# Patient Record
Sex: FEMALE | Race: WHITE | URBAN - METROPOLITAN AREA
[De-identification: names, ages, dates, MRNs, and addresses within clinical notes are randomized per-mention and may not be internally consistent; named-entity substitution may affect disease eponyms.]

---

## 2018-06-20 ENCOUNTER — OFFICE VISIT (OUTPATIENT)
Dept: ONCOLOGY | Facility: CLINIC | Age: 28
End: 2018-06-20
Attending: GENETIC COUNSELOR, MS
Payer: COMMERCIAL

## 2018-06-20 DIAGNOSIS — Z84.81 FAMILY HISTORY OF BRCA2 GENE POSITIVE: Primary | ICD-10-CM

## 2018-06-20 DIAGNOSIS — Z80.0 FAMILY HISTORY OF COLON CANCER: ICD-10-CM

## 2018-06-20 DIAGNOSIS — Z80.41 FAMILY HISTORY OF MALIGNANT NEOPLASM OF OVARY: ICD-10-CM

## 2018-06-20 PROCEDURE — 96040 ZZH GENETIC COUNSELING, EACH 30 MINUTES: CPT | Mod: ZF | Performed by: GENETIC COUNSELOR, MS

## 2018-06-20 NOTE — MR AVS SNAPSHOT
After Visit Summary   6/20/2018    Leigh Ann Houser    MRN: [id number]           Patient Information     Date Of Birth          1990        Visit Information        Provider Department      6/20/2018 2:15 PM Samira Taylor GC;  2 [address] CONSULT [facility]        Today's Diagnoses     Family history of BRCA2 gene positive    -  1    Family history of malignant neoplasm of ovary          Care Instructions        Assessing Cancer Risk  Only about 5-10% of cancers are thought to be due to an inherited cancer susceptibility gene.    These families often have:    Several people with the same or related types of cancer    Cancers diagnosed at a young age (before age 50)    Individuals with more than one primary cancer    Multiple generations of the family affected with cancer    BRCA1 and BRCA2 Genes  We each inherit two copies of every gene in our bodies: one from our mother, and one from our father.  Each gene has a specific job to do.  When a gene has a mistake or  mutation  in it, it does not work like it should.  Everyone has two copies of BRCA1 and two copies of BRCA2.  A single mutation in one of the copies of BRCA1 or BRCA2 increases the risk for breast and ovarian cancer, among others.  The risk for pancreatic cancer and melanoma may also be slightly increased in some families.  The tables below list the chance that someone with a BRCA mutation would develop cancer in his or her lifetime1,2,3,4.      Women   Men    General Population BRCA +   General Population BRCA +   Breast 12% 40-85%  Breast <1% ~7%   Ovarian 1-2% 10-40%  Prostate 16% 20%     Inheriting a mutation does not mean a person will develop cancer, but it does significantly increase his or her risk above the general population risk.    A person s ethnic background is also important to consider, as individuals of Ashkenazi Yarsanism ancestry have a higher chance of having a BRCA gene mutation.  There are three BRCA  mutations that occur more frequently in this population.     Genetic Testing  Genetic testing involves a simple blood test and will look at the genetic information in the BRCA1 and BRCA2 genes for any harmful mutations that are associated with increased cancer risk.  If possible, it is recommended that the person(s) who has had cancer be tested before other family members.  That person will give us the most useful information about whether or not a specific gene is associated with the cancer in the family.     Results  There are three possible results of BRCA1 and BRCA2 genetic testing:    Positive--a harmful mutation was identified    Negative--no mutation was identified    Variant of unknown significance--a variation in one of the genes was identified, but it is unclear how this impacts cancer risk in the family  Advantages and Disadvantages  There are advantages and disadvantages to genetic testing of these genes.    Advantages    May clarify your cancer risk    Can help you make medical decisions    May explain the cancers in your family    May give useful information to your family members (if you share your results)    Disadvantages    Possible negative emotional impact of learning about inherited cancer risk    Uncertainty in interpreting a negative test result in some situations    Possible genetic discrimination concerns (see below)    Inheritance   BRCA1 and BRCA2 mutations are inherited in an autosomal dominant pattern.  This means that if a parent has a mutation, each of his or her children will have a 50% chance of inheriting that same mutation.  Therefore, each child--male or female--would have a 50% chance of being at increased risk for developing cancer.                                              Image obtained from Genetics Home Reference, 2013     Genetic Information Nondiscrimination Act (NOEMÍ)  NOEMÍ is a federal law that protects individuals from health insurance or employment discrimination  based on a genetic test result alone.  Although rare, there are currently no legal protections in terms of life insurance, long term care, or disability insurances.  Visit the National Human Genome Research [place] at Genome.gov/88624772 to learn more.    Reducing Cancer Risk  Current screening recommendations for women with a BRCA mutation include1:    Breast:  o Breast awareness starting at age 18  o Clinical breast exams starting at age 25; repeated every 6-12 months  o Annual breast MRI starting at age 25 (or possibly younger)  o Annual mammogram and breast MRI at age 30 (for women with and without a breast cancer history)  o Consideration of preventative mastectomy    Ovarian:   o Consideration of transvaginal ultrasound and CA-125 blood test starting at age 30 (or possibly younger); repeated every 6 months  o Recommendation for surgery to remove the ovaries and fallopian tubes after child bearing or by age 35-40     Current screening recommendations for men with a BRCA mutation include1:    Breast:  o Self-breast exams starting at age 35  o Annual clinical breast exams starting at age 35    Prostate:   o Recommend prostate cancer screening starting at age 45 for BRCA2 carriers  o Consider prostate cancer screening starting at age 45 for BRCA1 carriers    Both men and women with BRCA mutations should talk to their doctor or genetic counselor about screening for pancreatic cancer and melanoma.  In addition, the age at which to start screening may be modified based on family history of young cancer.    Questions to Think About Regarding Genetic Testing    What effect will the test result have on me and my relationship with my family members if I have an inherited gene mutation?  If I don t have a gene mutation?    Should I share my test results, and how will my family react to this news, which may also affect them?    Are my children ready to learn new information that may one day affect their own  health?    Resources  FORCE: Facing Our Risk of Cancer Empowered [email or web address]   Bright Pink bebrightpink.org   American Cancer Society (ACS) cancer.org   National Cancer [place] (NCI) cancer.gov     Please call us if you have any questions or concerns.     Cancer Risk Management Program [phone]-CANCER ([phone]  ? Michelle Conrad, MS [facility]          [phone]  ? Jayne Marcelo, MS, [facility]  [phone]  ? Samira Taylor, MS, [facility]  [phone]  ? Jeovany Santoro, MS, [facility]  [phone]    References:  1. National Comprehensive Cancer Network. Clinical practice guidelines in oncology, colorectal cancer screening. Available online (registration required). 2013.            Follow-ups after your visit        Your next 10 appointments already scheduled     Jun 20, 2018  3:30 PM CDT   [facility] with  [facility]   [facility] ([facility])    [address] [place] [address]  [address] [place] [id number] [phone]              Who to contact     If you have questions or need follow up information about today's clinic visit or your schedule please contact [facility] directly at [phone].  Normal or non-critical lab and imaging results will be communicated to you by MyChart, letter or phone within 4 business days after the clinic has received the results. If you do not hear from us within 7 days, please contact the clinic through MyChart or phone. If you have a critical or abnormal lab result, we will notify you by phone as soon as possible.  Submit refill requests through [employer] or call your pharmacy and they will forward the refill request to us. Please allow 3 business days for your refill to be completed.          Additional Information About Your Visit        Nu3 Information     Nu3 gives you secure access to your electronic health record. If you see a primary care provider, you can also send messages to your care team and make  appointments. If you have questions, please call your primary care clinic.  If you do not have a primary care provider, please call [phone] and they will assist you.        Care EveryWhere ID     This is your Care EveryWhere ID. This could be used by other organizations to access your [place] medical records  [id number]         Blood Pressure from Last 3 Encounters:   No data found for BP    Weight from Last 3 Encounters:   No data found for Wt              Today, you had the following     No orders found for display       Primary Care Provider Fax #    Physician No Ref-Primary [phone]       No address on file        Equal Access to Services     [facility]: Hadii neisha Magadleno, waho trinidad, lynn bartlett, ziggy cedeno . So [facility] [phone].    ATENCIÓN: Si habla español, tiene a eric disposición servicios gratuitos de asistencia lingüística. [facility] [phone].    We comply with applicable federal civil rights laws and [place] laws. We do not discriminate on the basis of race, color, national origin, age, disability, sex, sexual orientation, or gender identity.            Thank you!     Thank you for choosing [facility]  for your care. Our goal is always to provide you with excellent care. Hearing back from our patients is one way we can continue to improve our services. Please take a few minutes to complete the written survey that you may receive in the mail after your visit with us. Thank you!             Your Updated Medication List - Protect others around you: Learn how to safely use, store and throw away your medicines at www.disposemymeds.org.      Notice  As of 6/20/2018  3:07 PM    You have not been prescribed any medications.

## 2018-06-20 NOTE — LETTER
Cancer Risk Management  Program Locations    [facility]  [facility]  Mailing Address  Cancer Risk Management Program  [facility]  [address]  [address] [address]  [place] [address]    New patient appointments  [phone]  June 21, 2018    Leigh Ann Houser  [address]  [place] [address]      Dear Leigh Ann,    It was a pleasure meeting with you at the [facility] on 6/20/2018.  Here is a copy of the progress note from your recent genetic counseling visit to the Cancer Risk Management Program.  If you have any additional questions, please feel free to call.    Referring Provider: Billie Mitchell    Presenting Information:   I met with Leigh Ann Houser today for genetic counseling at the Cancer Risk Management Program at the [facility] to discuss her family history of ovarian cancer with an identified BRCA2 gene mutation.  She is here today to review this history, cancer screening recommendations, and available genetic testing options.    Personal History:  Leigh Ann is a 27 year old female.  She does not have any personal history of cancer.  She had her first menstrual period at age 16, and does not have children.  Leigh Ann has her ovaries, fallopian tubes and uterus in place, and she is not regularly followed with ovarian cancer screening.  She reports a brief history of oral contraceptive use for 3 months.  She is planning to make an Ob-GYN appointment soon.  She reports seeing a dermatologist as needed and has a history of one benign mole removed.  She does not regularly do any other cancer screening at this time.      Family History: (Please see scanned pedigree for detailed family history information)    Leigh Ann's mother is 59 and was diagnosed with ovarian cancer and endometrial stromal sarcoma at  age 54.  She completed genetic testing which reportedly identified a BRCA2 gene mutation, in which she then underwent risk reducing bilateral mastectomies. A copy of her genetic test report was not available today for review, but will be obtained following Leigh Ann's visit today.      Leigh Ann shared that two of her sisters did complete genetic testing for the familial BRCA2 gene mutation, which was negative.      Her maternal grandmother was diagnosed with ovarian cancer in her mid-70's and passed away at age 91.      Leigh Ann's father is 58 and has a history of a reportedly benign brain tumor.      Her paternal uncle was diagnosed with colon cancer in his mid-50's.    Her maternal ethnicity is Serbian. Her paternal ethnicity is Serbian.  There is no known Ashkenazi Buddhism ancestry on either side of her family.     Discussion:    Leigh Ann has a family history of ovarian cancer with an identified BRCA2 gene mutation. We discussed the impact of this history on Leigh Ann and her family in detail.      We discussed the natural history and genetics of Hereditary Breast and Ovarian Cancer (HBOC) caused by mutations in the BRCA2 gene. A detailed handout regarding this cancer syndrome and the information we discussed was provided to Leigh Ann at the end of our appointment today and can be found in the after visit summary.  Topics included: inheritance pattern, cancer risks, cancer screening recommendations, and also risks, benefits and limitations of testing.    We reviewed that mutations in the BRCA2 gene are inherited in an autosomal dominant pattern.  This means that Leigh Ann has a 50% chance of inheriting the same mutation which was identified in her mother.  Mutations in this gene to not skip generations.     Genetic testing is available for the BRCA2 gene mutation identified in Leigh Ann's family.  We discussed that this targeted/single site analysis can be completed when a copy of a relatives' positive test report  documenting the specific mutation is obtained.   If a copy of her mother's report is not available, we also discussed the option of more comprehensive testing (including full gene sequencing and deletion/duplication analysis).  As a copy of her mother's test report was not available today, Leigh Ann elected to postpone genetic testing and will return to clinic with this information. These testing options will be revisited at a future appointment.       The information from genetic testing may determine additional cancer screening recommendations as well as options for risk reducing surgeries for Leigh Ann.  These recommendations will be discussed in detail once genetic testing is completed.    Plan:  1) No genetic testing was completed today.  2) Leigh Ann plans to schedule a return genetic counseling appointment to revisit testing after obtaining a copy of her mother's positive BRCA2 test report.  3) My contact information was provided, and I encouraged Leigh Ann to contact me directly if she has any questions regarding the information discussed, or to schedule a return appointment.      Samira Taylor MS, [facility]  Licensed Genetic Counselor  [phone]

## 2018-06-20 NOTE — PATIENT INSTRUCTIONS
Assessing Cancer Risk  Only about 5-10% of cancers are thought to be due to an inherited cancer susceptibility gene.    These families often have:    Several people with the same or related types of cancer    Cancers diagnosed at a young age (before age 50)    Individuals with more than one primary cancer    Multiple generations of the family affected with cancer    BRCA1 and BRCA2 Genes  We each inherit two copies of every gene in our bodies: one from our mother, and one from our father.  Each gene has a specific job to do.  When a gene has a mistake or  mutation  in it, it does not work like it should.  Everyone has two copies of BRCA1 and two copies of BRCA2.  A single mutation in one of the copies of BRCA1 or BRCA2 increases the risk for breast and ovarian cancer, among others.  The risk for pancreatic cancer and melanoma may also be slightly increased in some families.  The tables below list the chance that someone with a BRCA mutation would develop cancer in his or her lifetime1,2,3,4.      Women   Men    General Population BRCA +   General Population BRCA +   Breast 12% 40-85%  Breast <1% ~7%   Ovarian 1-2% 10-40%  Prostate 16% 20%     Inheriting a mutation does not mean a person will develop cancer, but it does significantly increase his or her risk above the general population risk.    A person s ethnic background is also important to consider, as individuals of Ashkenazi Yarsanism ancestry have a higher chance of having a BRCA gene mutation.  There are three BRCA mutations that occur more frequently in this population.     Genetic Testing  Genetic testing involves a simple blood test and will look at the genetic information in the BRCA1 and BRCA2 genes for any harmful mutations that are associated with increased cancer risk.  If possible, it is recommended that the person(s) who has had cancer be tested before other family members.  That person will give us the most useful information about whether or not  a specific gene is associated with the cancer in the family.     Results  There are three possible results of BRCA1 and BRCA2 genetic testing:    Positive--a harmful mutation was identified    Negative--no mutation was identified    Variant of unknown significance--a variation in one of the genes was identified, but it is unclear how this impacts cancer risk in the family  Advantages and Disadvantages  There are advantages and disadvantages to genetic testing of these genes.    Advantages    May clarify your cancer risk    Can help you make medical decisions    May explain the cancers in your family    May give useful information to your family members (if you share your results)    Disadvantages    Possible negative emotional impact of learning about inherited cancer risk    Uncertainty in interpreting a negative test result in some situations    Possible genetic discrimination concerns (see below)    Inheritance   BRCA1 and BRCA2 mutations are inherited in an autosomal dominant pattern.  This means that if a parent has a mutation, each of his or her children will have a 50% chance of inheriting that same mutation.  Therefore, each child--male or female--would have a 50% chance of being at increased risk for developing cancer.                                              Image obtained from Genetics Home Reference, 2013     Genetic Information Nondiscrimination Act (NOEMÍ)  NOEMÍ is a federal law that protects individuals from health insurance or employment discrimination based on a genetic test result alone.  Although rare, there are currently no legal protections in terms of life insurance, long term care, or disability insurances.  Visit the National Human Genome Research [place] at Genome.gov/95090028 to learn more.    Reducing Cancer Risk  Current screening recommendations for women with a BRCA mutation include1:    Breast:  o Breast awareness starting at age 18  o Clinical breast exams starting at age 25;  repeated every 6-12 months  o Annual breast MRI starting at age 25 (or possibly younger)  o Annual mammogram and breast MRI at age 30 (for women with and without a breast cancer history)  o Consideration of preventative mastectomy    Ovarian:   o Consideration of transvaginal ultrasound and CA-125 blood test starting at age 30 (or possibly younger); repeated every 6 months  o Recommendation for surgery to remove the ovaries and fallopian tubes after child bearing or by age 35-40     Current screening recommendations for men with a BRCA mutation include1:    Breast:  o Self-breast exams starting at age 35  o Annual clinical breast exams starting at age 35    Prostate:   o Recommend prostate cancer screening starting at age 45 for BRCA2 carriers  o Consider prostate cancer screening starting at age 45 for BRCA1 carriers    Both men and women with BRCA mutations should talk to their doctor or genetic counselor about screening for pancreatic cancer and melanoma.  In addition, the age at which to start screening may be modified based on family history of young cancer.    Questions to Think About Regarding Genetic Testing    What effect will the test result have on me and my relationship with my family members if I have an inherited gene mutation?  If I don t have a gene mutation?    Should I share my test results, and how will my family react to this news, which may also affect them?    Are my children ready to learn new information that may one day affect their own health?    Resources  FORCE: Facing Our Risk of Cancer Empowered [email or web address].org   Bright Pink bebrightpink.org   American Cancer Society (ACS) cancer.org   National Cancer [place] (NCI) cancer.gov     Please call us if you have any questions or concerns.     Cancer Risk Management Program [phone]  ? Michelle Conrad, MS [facility]          [phone]  ? Jayne Robertson, MS, [facility]  [phone]  ? Samira Taylor, MS, [facility]  [phone]  ? Jeovany  MS Yvan, [facility]  [phone]    References:  1. National Comprehensive Cancer Network. Clinical practice guidelines in oncology, colorectal cancer screening. Available online (registration required). 2013.

## 2018-06-20 NOTE — PROGRESS NOTES
6/20/2018    Referring Provider: Billie Mitchell    Presenting Information:   I met with Leigh Ann Houser today for genetic counseling at the Cancer Risk Management Program at the [facility] to discuss her family history of ovarian cancer with an identified BRCA2 gene mutation.  She is here today to review this history, cancer screening recommendations, and available genetic testing options.    Personal History:  Leigh Ann is a 27 year old female.  She does not have any personal history of cancer.  She had her first menstrual period at age 16, and does not have children.  Leigh Ann has her ovaries, fallopian tubes and uterus in place, and she is not regularly followed with ovarian cancer screening.  She reports a brief history of oral contraceptive use for 3 months.  She is planning to make an Ob-GYN appointment soon.  She reports seeing a dermatologist as needed and has a history of one benign mole removed.  She does not regularly do any other cancer screening at this time.      Family History: (Please see scanned pedigree for detailed family history information)    Leigh Ann's mother is 59 and was diagnosed with ovarian cancer and endometrial stromal sarcoma at age 54.  She completed genetic testing which reportedly identified a BRCA2 gene mutation, in which she then underwent risk reducing bilateral mastectomies. A copy of her genetic test report was not available today for review, but will be obtained following Leigh Ann's visit today.      Leigh Ann shared that two of her sisters did complete genetic testing for the familial BRCA2 gene mutation, which was negative.      Her maternal grandmother was diagnosed with ovarian cancer in her mid-70's and passed away at age 91.      Leigh Ann's father is 58 and has a history of a reportedly benign brain tumor.      Her paternal uncle was diagnosed with colon cancer in his mid-50's.    Her maternal ethnicity is [place]. Her paternal ethnicity is [place].  There is no  known Ashkenazi Jehovah's witness ancestry on either side of her family.     Discussion:    Leigh Ann has a family history of ovarian cancer with an identified BRCA2 gene mutation. We discussed the impact of this history on Leigh Ann and her family in detail.      We discussed the natural history and genetics of Hereditary Breast and Ovarian Cancer (HBOC) caused by mutations in the BRCA2 gene. A detailed handout regarding this cancer syndrome and the information we discussed was provided to Leigh Ann at the end of our appointment today and can be found in the after visit summary.  Topics included: inheritance pattern, cancer risks, cancer screening recommendations, and also risks, benefits and limitations of testing.    We reviewed that mutations in the BRCA2 gene are inherited in an autosomal dominant pattern.  This means that Leigh Ann has a 50% chance of inheriting the same mutation which was identified in her mother.  Mutations in this gene to not skip generations.     Genetic testing is available for the BRCA2 gene mutation identified in Leigh Ann's family.  We discussed that this targeted/single site analysis can be completed when a copy of a relatives' positive test report documenting the specific mutation is obtained.   If a copy of her mother's report is not available, we also discussed the option of more comprehensive testing (including full gene sequencing and deletion/duplication analysis).  As a copy of her mother's test report was not available today, Leigh Ann elected to postpone genetic testing and will return to clinic with this information. These testing options will be revisited at a future appointment.       The information from genetic testing may determine additional cancer screening recommendations as well as options for risk reducing surgeries for Leigh Ann.  These recommendations will be discussed in detail once genetic testing is completed.    Plan:  1) No genetic testing was completed today.  2) Leigh Ann  plans to schedule a return genetic counseling appointment to revisit testing after obtaining a copy of her mother's positive BRCA2 test report.  3) My contact information was provided, and I encouraged Leigh Ann to contact me directly if she has any questions regarding the information discussed, or to schedule a return appointment.      Face to face time: 45 minutes    Samira Taylor MS, [facility]  Licensed Genetic Counselor  [phone]

## 2018-06-20 NOTE — LETTER
6/20/2018       RE: Leigh Ann Houser  [address] [place] [id number]     Dear Colleague,    Thank you for referring your patient, Leigh Ann Houser, to the [facility]. Please see a copy of my visit note below.    6/20/2018    Referring Provider: Billie Mitchell    Presenting Information:   I met with Leigh Ann Houser today for genetic counseling at the Cancer Risk Management Program at the [facility] to discuss her family history of ovarian cancer with an identified BRCA2 gene mutation.  She is here today to review this history, cancer screening recommendations, and available genetic testing options.    Personal History:  Leigh Ann is a 27 year old female.  She does not have any personal history of cancer.  She had her first menstrual period at age 16, and does not have children.  Leigh Ann has her ovaries, fallopian tubes and uterus in place, and she is not regularly followed with ovarian cancer screening.  She reports a brief history of oral contraceptive use for 3 months.  She is planning to make an Ob-GYN appointment soon.  She reports seeing a dermatologist as needed and has a history of one benign mole removed.  She does not regularly do any other cancer screening at this time.      Family History: (Please see scanned pedigree for detailed family history information)    Leigh Ann's mother is 59 and was diagnosed with ovarian cancer and endometrial stromal sarcoma at age 54.  She completed genetic testing which reportedly identified a BRCA2 gene mutation, in which she then underwent risk reducing bilateral mastectomies. A copy of her genetic test report was not available today for review, but will be obtained following Leigh Ann's visit today.      Leigh Ann shared that two of her sisters did complete genetic testing for the familial BRCA2 gene mutation, which was negative.      Her maternal grandmother was diagnosed with ovarian cancer in her mid-70's and passed away at age  91.      Leigh Ann's father is 58 and has a history of a reportedly benign brain tumor.      Her paternal uncle was diagnosed with colon cancer in his mid-50's.    Her maternal ethnicity is [place]. Her paternal ethnicity is [place].  There is no known Ashkenazi Rastafari ancestry on either side of her family.     Discussion:    Leigh Ann has a family history of ovarian cancer with an identified BRCA2 gene mutation. We discussed the impact of this history on Leigh Ann and her family in detail.      We discussed the natural history and genetics of Hereditary Breast and Ovarian Cancer (HBOC) caused by mutations in the BRCA2 gene. A detailed handout regarding this cancer syndrome and the information we discussed was provided to Leigh Ann at the end of our appointment today and can be found in the after visit summary.  Topics included: inheritance pattern, cancer risks, cancer screening recommendations, and also risks, benefits and limitations of testing.    We reviewed that mutations in the BRCA2 gene are inherited in an autosomal dominant pattern.  This means that Leigh Ann has a 50% chance of inheriting the same mutation which was identified in her mother.  Mutations in this gene to not skip generations.     Genetic testing is available for the BRCA2 gene mutation identified in Leigh Ann's family.  We discussed that this targeted/single site analysis can be completed when a copy of a relatives' positive test report documenting the specific mutation is obtained.   If a copy of her mother's report is not available, we also discussed the option of more comprehensive testing (including full gene sequencing and deletion/duplication analysis).  As a copy of her mother's test report was not available today, Leigh Ann elected to postpone genetic testing and will return to clinic with this information. These testing options will be revisited at a future appointment.       The information from genetic testing may determine additional  cancer screening recommendations as well as options for risk reducing surgeries for Leigh Ann.  These recommendations will be discussed in detail once genetic testing is completed.    Plan:  1) No genetic testing was completed today.  2) Leigh Ann plans to schedule a return genetic counseling appointment to revisit testing after obtaining a copy of her mother's positive BRCA2 test report.  3) My contact information was provided, and I encouraged Leigh Ann to contact me directly if she has any questions regarding the information discussed, or to schedule a return appointment.      Face to face time: 45 minutes    Samira Taylor MS, [facility]  Licensed Genetic Counselor  [phone]

## 2018-06-23 ENCOUNTER — HEALTH MAINTENANCE LETTER (OUTPATIENT)
Age: 28
End: 2018-06-23

## 2018-09-17 ENCOUNTER — DOCUMENTATION ONLY (OUTPATIENT)
Dept: ONCOLOGY | Facility: CLINIC | Age: 28
End: 2018-09-17

## 2018-09-17 NOTE — PROGRESS NOTES
"I received the following message from Leigh Ann by email today:     \"Lopez Hogan,   I know it's been a bit--I believe we met in the middle of June-- but I wanted to let you know that my mom is in the process of contacting [facility] in [place] to send over her genetic test results, lab name, etc. I believe they will be faxing them to you in the next couple of days. Please let me know if this is incorrect, or if you need other information from me. Once all the information is in place I'd like to move forward with testing, etc.   Thank you!   Leigh Ann Houser\"    I followed up with Leigh Ann to confirm receipt of her message, and will contact her once these documents are received.  She is encouraged to contact me should she have questions in the meantime.  My contact information was provided.      Samira Taylor MS, Loc[facility]  Licensed Genetic Counselor  [phone]          "

## 2018-10-09 ENCOUNTER — TELEPHONE (OUTPATIENT)
Dept: ONCOLOGY | Facility: CLINIC | Age: 28
End: 2018-10-09

## 2018-10-09 NOTE — TELEPHONE ENCOUNTER
I called Leigha Nn today to revisit her interest in proceeding with genetic testing, and check in regarding the availability of her mother's genetic test result (see progress note from 6/20/2018 and note from 9/17/2018 for details).  As Leigh Ann was not available, I left a non-detailed voicemail message with my contact information.  I can be reached at [phone].    Samira Taylor MS, [facility]  Licensed Genetic Counselor  [phone]

## 2019-12-08 ENCOUNTER — HEALTH MAINTENANCE LETTER (OUTPATIENT)
Age: 29
End: 2019-12-08

## 2020-03-15 ENCOUNTER — HEALTH MAINTENANCE LETTER (OUTPATIENT)
Age: 30
End: 2020-03-15